# Patient Record
Sex: FEMALE | Race: ASIAN | NOT HISPANIC OR LATINO | URBAN - METROPOLITAN AREA
[De-identification: names, ages, dates, MRNs, and addresses within clinical notes are randomized per-mention and may not be internally consistent; named-entity substitution may affect disease eponyms.]

---

## 2017-01-09 ENCOUNTER — OUTPATIENT (OUTPATIENT)
Dept: OUTPATIENT SERVICES | Facility: HOSPITAL | Age: 47
LOS: 1 days | End: 2017-01-09

## 2017-01-09 VITALS
HEART RATE: 64 BPM | TEMPERATURE: 99 F | WEIGHT: 132.06 LBS | SYSTOLIC BLOOD PRESSURE: 108 MMHG | DIASTOLIC BLOOD PRESSURE: 64 MMHG | RESPIRATION RATE: 16 BRPM | HEIGHT: 66.5 IN

## 2017-01-09 DIAGNOSIS — Z98.890 OTHER SPECIFIED POSTPROCEDURAL STATES: Chronic | ICD-10-CM

## 2017-01-09 DIAGNOSIS — N85.8 OTHER SPECIFIED NONINFLAMMATORY DISORDERS OF UTERUS: ICD-10-CM

## 2017-01-09 DIAGNOSIS — N85.9 NONINFLAMMATORY DISORDER OF UTERUS, UNSPECIFIED: ICD-10-CM

## 2017-01-09 LAB
ALBUMIN SERPL ELPH-MCNC: 3.8 G/DL — SIGNIFICANT CHANGE UP (ref 3.3–5)
ALP SERPL-CCNC: 31 U/L — LOW (ref 40–120)
ALT FLD-CCNC: 11 U/L — SIGNIFICANT CHANGE UP (ref 4–33)
AST SERPL-CCNC: 14 U/L — SIGNIFICANT CHANGE UP (ref 4–32)
BILIRUB SERPL-MCNC: 0.2 MG/DL — SIGNIFICANT CHANGE UP (ref 0.2–1.2)
BLD GP AB SCN SERPL QL: NEGATIVE — SIGNIFICANT CHANGE UP
BUN SERPL-MCNC: 6 MG/DL — LOW (ref 7–23)
CALCIUM SERPL-MCNC: 8.6 MG/DL — SIGNIFICANT CHANGE UP (ref 8.4–10.5)
CHLORIDE SERPL-SCNC: 103 MMOL/L — SIGNIFICANT CHANGE UP (ref 98–107)
CO2 SERPL-SCNC: 26 MMOL/L — SIGNIFICANT CHANGE UP (ref 22–31)
CREAT SERPL-MCNC: 0.62 MG/DL — SIGNIFICANT CHANGE UP (ref 0.5–1.3)
GLUCOSE SERPL-MCNC: 66 MG/DL — LOW (ref 70–99)
HCT VFR BLD CALC: 34.9 % — SIGNIFICANT CHANGE UP (ref 34.5–45)
HGB BLD-MCNC: 11.1 G/DL — LOW (ref 11.5–15.5)
MCHC RBC-ENTMCNC: 26 PG — LOW (ref 27–34)
MCHC RBC-ENTMCNC: 31.8 % — LOW (ref 32–36)
MCV RBC AUTO: 81.7 FL — SIGNIFICANT CHANGE UP (ref 80–100)
PLATELET # BLD AUTO: 410 K/UL — HIGH (ref 150–400)
PMV BLD: 9.8 FL — SIGNIFICANT CHANGE UP (ref 7–13)
POTASSIUM SERPL-MCNC: 4.1 MMOL/L — SIGNIFICANT CHANGE UP (ref 3.5–5.3)
POTASSIUM SERPL-SCNC: 4.1 MMOL/L — SIGNIFICANT CHANGE UP (ref 3.5–5.3)
PROT SERPL-MCNC: 6.4 G/DL — SIGNIFICANT CHANGE UP (ref 6–8.3)
RBC # BLD: 4.27 M/UL — SIGNIFICANT CHANGE UP (ref 3.8–5.2)
RBC # FLD: SIGNIFICANT CHANGE UP % (ref 10.3–14.5)
RH IG SCN BLD-IMP: POSITIVE — SIGNIFICANT CHANGE UP
SODIUM SERPL-SCNC: 141 MMOL/L — SIGNIFICANT CHANGE UP (ref 135–145)
WBC # BLD: 5.72 K/UL — SIGNIFICANT CHANGE UP (ref 3.8–10.5)
WBC # FLD AUTO: 5.72 K/UL — SIGNIFICANT CHANGE UP (ref 3.8–10.5)

## 2017-01-09 RX ORDER — SODIUM CHLORIDE 9 MG/ML
1000 INJECTION, SOLUTION INTRAVENOUS
Qty: 0 | Refills: 0 | Status: DISCONTINUED | OUTPATIENT
Start: 2017-01-20 | End: 2017-01-21

## 2017-01-09 NOTE — H&P PST ADULT - GENITOURINARY COMMENTS
hx of fibroids with heavy menstruation x 5 years. Pt reports fibroids recently enlarged, and she has had vaginal bleeding between periods x 6 months.

## 2017-01-09 NOTE — H&P PST ADULT - NSANTHOSAYNRD_GEN_A_CORE
No. RADHA screening performed.  STOP BANG Legend: 0-2 = LOW Risk; 3-4 = INTERMEDIATE Risk; 5-8 = HIGH Risk

## 2017-01-09 NOTE — H&P PST ADULT - HISTORY OF PRESENT ILLNESS
45 y/o female with hx 47 y/o female with hx of fibroids with heavy menstruation x 5 years. Pt reports fibroids recently enlarged, and she has had vaginal bleeding between periods x 6 months. Now scheduled for Exploratory Laparotomy, NORMA, BSO, possible staging 1/220/17. 47 y/o female with hx of fibroids with heavy menstruation x 5 years. Pt reports fibroids recently enlarged, and she has had vaginal bleeding between periods x 6 months. Now scheduled for Exploratory Laparotomy, NORMA, BSO, possible staging 1/20/17.

## 2017-01-09 NOTE — H&P PST ADULT - PROBLEM SELECTOR PLAN 1
Exploratory Laparotomy, NORMA, BSO, possible staging 1/220/17.     CBC CMP T&S, UCG on admission Exploratory Laparotomy, NORMA, BSO, possible staging 1/20/17.     CBC CMP T&S, UCG on admission

## 2017-01-09 NOTE — H&P PST ADULT - NEGATIVE GENERAL GENITOURINARY SYMPTOMS
no hematuria/no flank pain L/no bladder infections no bladder infections/normal urinary frequency/no hematuria/no flank pain L

## 2017-01-09 NOTE — H&P PST ADULT - LANGUAGE ASSISTANCE NEEDED
No-Patient/Caregiver offered and refused free interpretation services./Pt would like  service day of admission

## 2017-01-09 NOTE — H&P PST ADULT - ASSESSMENT
47 y/o female with hx of fibroids with heavy menstruation x 5 years. Pt reports fibroids recently enlarged, and she has had vaginal bleeding between periods x 6 months. Now scheduled for Exploratory Laparotomy, NORMA, BSO, possible staging 1/220/17. 45 y/o female with hx of fibroids with heavy menstruation x 5 years. Pt reports fibroids recently enlarged, and she has had vaginal bleeding between periods x 6 months. Now scheduled for Exploratory Laparotomy, NORMA, BSO, possible staging 1/20/17.

## 2017-01-19 ENCOUNTER — RESULT REVIEW (OUTPATIENT)
Age: 47
End: 2017-01-19

## 2017-01-20 ENCOUNTER — INPATIENT (INPATIENT)
Facility: HOSPITAL | Age: 47
LOS: 1 days | Discharge: HOME | End: 2017-01-22
Attending: OBSTETRICS & GYNECOLOGY | Admitting: OBSTETRICS & GYNECOLOGY
Payer: MEDICAID

## 2017-01-20 ENCOUNTER — APPOINTMENT (OUTPATIENT)
Dept: GYNECOLOGIC ONCOLOGY | Facility: HOSPITAL | Age: 47
End: 2017-01-20

## 2017-01-20 VITALS
RESPIRATION RATE: 16 BRPM | TEMPERATURE: 99 F | SYSTOLIC BLOOD PRESSURE: 115 MMHG | WEIGHT: 132.06 LBS | DIASTOLIC BLOOD PRESSURE: 69 MMHG | HEIGHT: 66.5 IN | HEART RATE: 68 BPM | OXYGEN SATURATION: 99 %

## 2017-01-20 DIAGNOSIS — N85.8 OTHER SPECIFIED NONINFLAMMATORY DISORDERS OF UTERUS: ICD-10-CM

## 2017-01-20 DIAGNOSIS — Z98.890 OTHER SPECIFIED POSTPROCEDURAL STATES: Chronic | ICD-10-CM

## 2017-01-20 LAB
HCG UR QL: NEGATIVE — SIGNIFICANT CHANGE UP
RH IG SCN BLD-IMP: POSITIVE — SIGNIFICANT CHANGE UP

## 2017-01-20 PROCEDURE — 88307 TISSUE EXAM BY PATHOLOGIST: CPT | Mod: 26

## 2017-01-20 PROCEDURE — 58150 TOTAL HYSTERECTOMY: CPT | Mod: GC

## 2017-01-20 PROCEDURE — 88331 PATH CONSLTJ SURG 1 BLK 1SPC: CPT | Mod: 26

## 2017-01-20 RX ORDER — OXYCODONE HYDROCHLORIDE 5 MG/1
1 TABLET ORAL
Qty: 20 | Refills: 0 | OUTPATIENT
Start: 2017-01-20 | End: 2017-01-27

## 2017-01-20 RX ORDER — ONDANSETRON 8 MG/1
4 TABLET, FILM COATED ORAL ONCE
Qty: 0 | Refills: 0 | Status: DISCONTINUED | OUTPATIENT
Start: 2017-01-20 | End: 2017-01-22

## 2017-01-20 RX ORDER — HEPARIN SODIUM 5000 [USP'U]/ML
5000 INJECTION INTRAVENOUS; SUBCUTANEOUS EVERY 12 HOURS
Qty: 0 | Refills: 0 | Status: DISCONTINUED | OUTPATIENT
Start: 2017-01-20 | End: 2017-01-22

## 2017-01-20 RX ORDER — OXYCODONE HYDROCHLORIDE 5 MG/1
1 TABLET ORAL
Qty: 20 | Refills: 0 | OUTPATIENT
Start: 2017-01-20 | End: 2017-01-25

## 2017-01-20 RX ORDER — SODIUM CHLORIDE 9 MG/ML
1000 INJECTION, SOLUTION INTRAVENOUS
Qty: 0 | Refills: 0 | Status: DISCONTINUED | OUTPATIENT
Start: 2017-01-20 | End: 2017-01-21

## 2017-01-20 RX ORDER — DOCUSATE SODIUM 100 MG
100 CAPSULE ORAL
Qty: 0 | Refills: 0 | Status: DISCONTINUED | OUTPATIENT
Start: 2017-01-20 | End: 2017-01-22

## 2017-01-20 RX ORDER — HYDROMORPHONE HYDROCHLORIDE 2 MG/ML
0.5 INJECTION INTRAMUSCULAR; INTRAVENOUS; SUBCUTANEOUS
Qty: 0 | Refills: 0 | Status: DISCONTINUED | OUTPATIENT
Start: 2017-01-20 | End: 2017-01-21

## 2017-01-20 RX ORDER — HEPARIN SODIUM 5000 [USP'U]/ML
5000 INJECTION INTRAVENOUS; SUBCUTANEOUS EVERY 8 HOURS
Qty: 0 | Refills: 0 | Status: DISCONTINUED | OUTPATIENT
Start: 2017-01-20 | End: 2017-01-20

## 2017-01-20 RX ORDER — ONDANSETRON 8 MG/1
4 TABLET, FILM COATED ORAL EVERY 8 HOURS
Qty: 0 | Refills: 0 | Status: DISCONTINUED | OUTPATIENT
Start: 2017-01-20 | End: 2017-01-22

## 2017-01-20 RX ORDER — HEPARIN SODIUM 5000 [USP'U]/ML
5000 INJECTION INTRAVENOUS; SUBCUTANEOUS ONCE
Qty: 0 | Refills: 0 | Status: COMPLETED | OUTPATIENT
Start: 2017-01-20 | End: 2017-01-20

## 2017-01-20 RX ADMIN — SODIUM CHLORIDE 100 MILLILITER(S): 9 INJECTION, SOLUTION INTRAVENOUS at 20:30

## 2017-01-20 RX ADMIN — SODIUM CHLORIDE 30 MILLILITER(S): 9 INJECTION, SOLUTION INTRAVENOUS at 16:43

## 2017-01-20 RX ADMIN — HEPARIN SODIUM 5000 UNIT(S): 5000 INJECTION INTRAVENOUS; SUBCUTANEOUS at 16:39

## 2017-01-20 RX ADMIN — HYDROMORPHONE HYDROCHLORIDE 0.5 MILLIGRAM(S): 2 INJECTION INTRAMUSCULAR; INTRAVENOUS; SUBCUTANEOUS at 21:03

## 2017-01-20 RX ADMIN — HYDROMORPHONE HYDROCHLORIDE 0.5 MILLIGRAM(S): 2 INJECTION INTRAMUSCULAR; INTRAVENOUS; SUBCUTANEOUS at 21:20

## 2017-01-20 NOTE — DISCHARGE NOTE ADULT - CARE PROVIDER_API CALL
Vandana Weathers (MD), OBSGYN  Oncology  [address] [place] [address]  Phone: [phone]  Fax: [phone] Vandana Weathers (MD), OBSGYN  Oncology  [address] [place] [address]  Phone: [phone]  Fax: [phone]

## 2017-01-20 NOTE — BRIEF OPERATIVE NOTE - PROCEDURE
Hysterectomy, total, abdominal, with bilateral salpingectomy  01/20/2017    Active  [facility]

## 2017-01-20 NOTE — DISCHARGE NOTE ADULT - PATIENT PORTAL LINK FT
“You can access the [facility] Patient Portal, offered by [facility], by registering with the following website: [email or web address]”

## 2017-01-20 NOTE — DISCHARGE NOTE ADULT - MEDICATION SUMMARY - MEDICATIONS TO TAKE
I will START or STAY ON the medications listed below when I get home from the hospital:    no home medications  --     -- Indication: For n/a    Naprosyn 500 mg oral tablet  -- 1 tab(s) by mouth 2 times a day MDD:2  -- Check with your doctor before becoming pregnant.  May cause drowsiness or dizziness.  Obtain medical advice before taking any non-prescription drugs as some may affect the action of this medication.  Take with food or milk.    -- Indication: For pain    acetaminophen-oxyCODONE 325 mg-5 mg oral tablet  -- 1 tab(s) by mouth every 6 hours, As Needed MDD:4  -- Caution federal law prohibits the transfer of this drug to any person other  than the person for whom it was prescribed.  May cause drowsiness.  Alcohol may intensify this effect.  Use care when operating dangerous machinery.  This prescription cannot be refilled.  This product contains acetaminophen.  Do not use  with any other product containing acetaminophen to prevent possible liver damage.  Using more of this medication than prescribed may cause serious breathing problems.    -- Indication: For pain

## 2017-01-20 NOTE — DISCHARGE NOTE ADULT - CARE PLAN
Principal Discharge DX:	Uterine fibroid  Goal:	Return to normal function  Instructions for follow-up, activity and diet:	1) Make a follow up appointment with Dr. Weathers in 1-2 weeks  2) Regular diet, Percocet and naprosyn for pain  3) No Heavy lifting, straining or sex for 4-6 weeks

## 2017-01-20 NOTE — BRIEF OPERATIVE NOTE - OPERATION/FINDINGS
22 week size globular uterus, left ovary with corpus luteal cyst, right ovary and fallopian tube grossly normal.  Grossly unremarkable appendix and upper abdomen

## 2017-01-20 NOTE — DISCHARGE NOTE ADULT - PLAN OF CARE
Return to normal function 1) Make a follow up appointment with Dr. Weathers in 1-2 weeks  2) Regular diet, Percocet and naprosyn for pain  3) No Heavy lifting, straining or sex for 4-6 weeks

## 2017-01-20 NOTE — DISCHARGE NOTE ADULT - HOSPITAL COURSE
45 yo s/p ex-lap, NORMA, BSO. Please see operative report for more details.     POD#1 pt was advanced to a regular diet. Rodriguez catheter was removed. Pain was well controlled with PCA.    On the day of discharge pt was ambulating, voiding and tolerating PO.    Pt will follow up in the office with Dr. Weathers in 1-2 weeks.    Postoperative Hct: 34.9-> 45 yo s/p ex-lap, NORMA, BSO. Please see operative report for more details.     POD#1 pt was advanced to a regular diet. Rodriguez catheter was removed. Pain was well controlled with PCA.    POD#2 PCA discontinued, pain well controlled with PO meds. Tolerating regular diet, voiding adequately.    On the day of discharge pt was ambulating, voiding and tolerating PO.    Pt will follow up in the office with Dr. Weathers in 1-2 weeks.    Postoperative Hct: 34.9->38.7->36.8 45 yo s/p ex-lap, NORMA, BSO. Please see operative report for more details.     POD#1 pt was advanced to a regular diet. Rodriguez catheter was removed. Pain was well controlled with oxycodone.    POD#2 pain well controlled with PO meds. Tolerating regular diet, voiding adequately.    On the day of discharge pt was ambulating, voiding and tolerating PO.    Pt will follow up in the office with Dr. Weathers in 1-2 weeks.    Postoperative Hct: 34.9->38.7->36.8

## 2017-01-20 NOTE — DISCHARGE NOTE ADULT - INSTRUCTIONS
Eat well balanced diet ,drink 8-10 glasses of water each day .IF temp above 100.4,bleeding from the surgical site notify MD

## 2017-01-21 LAB
BLD GP AB SCN SERPL QL: NEGATIVE — SIGNIFICANT CHANGE UP
BUN SERPL-MCNC: 11 MG/DL — SIGNIFICANT CHANGE UP (ref 7–23)
BUN SERPL-MCNC: 9 MG/DL — SIGNIFICANT CHANGE UP (ref 7–23)
CALCIUM SERPL-MCNC: 7.6 MG/DL — LOW (ref 8.4–10.5)
CALCIUM SERPL-MCNC: 7.9 MG/DL — LOW (ref 8.4–10.5)
CHLORIDE SERPL-SCNC: 101 MMOL/L — SIGNIFICANT CHANGE UP (ref 98–107)
CHLORIDE SERPL-SCNC: 101 MMOL/L — SIGNIFICANT CHANGE UP (ref 98–107)
CO2 SERPL-SCNC: 17 MMOL/L — LOW (ref 22–31)
CO2 SERPL-SCNC: 22 MMOL/L — SIGNIFICANT CHANGE UP (ref 22–31)
CREAT SERPL-MCNC: 0.6 MG/DL — SIGNIFICANT CHANGE UP (ref 0.5–1.3)
CREAT SERPL-MCNC: 0.63 MG/DL — SIGNIFICANT CHANGE UP (ref 0.5–1.3)
GLUCOSE SERPL-MCNC: 109 MG/DL — HIGH (ref 70–99)
GLUCOSE SERPL-MCNC: 126 MG/DL — HIGH (ref 70–99)
HCT VFR BLD CALC: 36.8 % — SIGNIFICANT CHANGE UP (ref 34.5–45)
HCT VFR BLD CALC: 38.7 % — SIGNIFICANT CHANGE UP (ref 34.5–45)
HGB BLD-MCNC: 12 G/DL — SIGNIFICANT CHANGE UP (ref 11.5–15.5)
HGB BLD-MCNC: 12.4 G/DL — SIGNIFICANT CHANGE UP (ref 11.5–15.5)
MCHC RBC-ENTMCNC: 27.5 PG — SIGNIFICANT CHANGE UP (ref 27–34)
MCHC RBC-ENTMCNC: 27.6 PG — SIGNIFICANT CHANGE UP (ref 27–34)
MCHC RBC-ENTMCNC: 32 % — SIGNIFICANT CHANGE UP (ref 32–36)
MCHC RBC-ENTMCNC: 32.6 % — SIGNIFICANT CHANGE UP (ref 32–36)
MCV RBC AUTO: 84.2 FL — SIGNIFICANT CHANGE UP (ref 80–100)
MCV RBC AUTO: 86 FL — SIGNIFICANT CHANGE UP (ref 80–100)
PLATELET # BLD AUTO: 152 K/UL — SIGNIFICANT CHANGE UP (ref 150–400)
PLATELET # BLD AUTO: 282 K/UL — SIGNIFICANT CHANGE UP (ref 150–400)
PMV BLD: 10.8 FL — SIGNIFICANT CHANGE UP (ref 7–13)
PMV BLD: SIGNIFICANT CHANGE UP FL (ref 7–13)
POTASSIUM SERPL-MCNC: 4 MMOL/L — SIGNIFICANT CHANGE UP (ref 3.5–5.3)
POTASSIUM SERPL-MCNC: 4.6 MMOL/L — SIGNIFICANT CHANGE UP (ref 3.5–5.3)
POTASSIUM SERPL-SCNC: 4 MMOL/L — SIGNIFICANT CHANGE UP (ref 3.5–5.3)
POTASSIUM SERPL-SCNC: 4.6 MMOL/L — SIGNIFICANT CHANGE UP (ref 3.5–5.3)
RBC # BLD: 4.37 M/UL — SIGNIFICANT CHANGE UP (ref 3.8–5.2)
RBC # BLD: 4.5 M/UL — SIGNIFICANT CHANGE UP (ref 3.8–5.2)
RBC # FLD: 23.7 % — HIGH (ref 10.3–14.5)
RBC # FLD: 24.1 % — HIGH (ref 10.3–14.5)
RH IG SCN BLD-IMP: POSITIVE — SIGNIFICANT CHANGE UP
SODIUM SERPL-SCNC: 135 MMOL/L — SIGNIFICANT CHANGE UP (ref 135–145)
SODIUM SERPL-SCNC: 136 MMOL/L — SIGNIFICANT CHANGE UP (ref 135–145)
WBC # BLD: 18.84 K/UL — HIGH (ref 3.8–10.5)
WBC # BLD: 19.26 K/UL — HIGH (ref 3.8–10.5)
WBC # FLD AUTO: 18.84 K/UL — HIGH (ref 3.8–10.5)
WBC # FLD AUTO: 19.26 K/UL — HIGH (ref 3.8–10.5)

## 2017-01-21 RX ORDER — HYDROMORPHONE HYDROCHLORIDE 2 MG/ML
0.5 INJECTION INTRAMUSCULAR; INTRAVENOUS; SUBCUTANEOUS EVERY 6 HOURS
Qty: 0 | Refills: 0 | Status: DISCONTINUED | OUTPATIENT
Start: 2017-01-21 | End: 2017-01-22

## 2017-01-21 RX ORDER — OXYCODONE HYDROCHLORIDE 5 MG/1
5 TABLET ORAL EVERY 4 HOURS
Qty: 0 | Refills: 0 | Status: DISCONTINUED | OUTPATIENT
Start: 2017-01-21 | End: 2017-01-22

## 2017-01-21 RX ORDER — ACETAMINOPHEN 500 MG
650 TABLET ORAL EVERY 6 HOURS
Qty: 0 | Refills: 0 | Status: DISCONTINUED | OUTPATIENT
Start: 2017-01-21 | End: 2017-01-22

## 2017-01-21 RX ORDER — OXYCODONE HYDROCHLORIDE 5 MG/1
10 TABLET ORAL EVERY 4 HOURS
Qty: 0 | Refills: 0 | Status: DISCONTINUED | OUTPATIENT
Start: 2017-01-21 | End: 2017-01-22

## 2017-01-21 RX ORDER — HYDROMORPHONE HYDROCHLORIDE 2 MG/ML
0.5 INJECTION INTRAMUSCULAR; INTRAVENOUS; SUBCUTANEOUS EVERY 4 HOURS
Qty: 0 | Refills: 0 | Status: DISCONTINUED | OUTPATIENT
Start: 2017-01-21 | End: 2017-01-21

## 2017-01-21 RX ORDER — SIMETHICONE 80 MG/1
80 TABLET, CHEWABLE ORAL
Qty: 0 | Refills: 0 | Status: DISCONTINUED | OUTPATIENT
Start: 2017-01-21 | End: 2017-01-22

## 2017-01-21 RX ADMIN — Medication 100 MILLIGRAM(S): at 17:19

## 2017-01-21 RX ADMIN — OXYCODONE HYDROCHLORIDE 10 MILLIGRAM(S): 5 TABLET ORAL at 04:42

## 2017-01-21 RX ADMIN — Medication 650 MILLIGRAM(S): at 09:58

## 2017-01-21 RX ADMIN — OXYCODONE HYDROCHLORIDE 10 MILLIGRAM(S): 5 TABLET ORAL at 21:02

## 2017-01-21 RX ADMIN — Medication 650 MILLIGRAM(S): at 01:37

## 2017-01-21 RX ADMIN — OXYCODONE HYDROCHLORIDE 10 MILLIGRAM(S): 5 TABLET ORAL at 04:12

## 2017-01-21 RX ADMIN — SIMETHICONE 80 MILLIGRAM(S): 80 TABLET, CHEWABLE ORAL at 20:13

## 2017-01-21 RX ADMIN — OXYCODONE HYDROCHLORIDE 10 MILLIGRAM(S): 5 TABLET ORAL at 21:32

## 2017-01-21 RX ADMIN — HEPARIN SODIUM 5000 UNIT(S): 5000 INJECTION INTRAVENOUS; SUBCUTANEOUS at 05:36

## 2017-01-21 RX ADMIN — Medication 650 MILLIGRAM(S): at 09:28

## 2017-01-21 RX ADMIN — OXYCODONE HYDROCHLORIDE 10 MILLIGRAM(S): 5 TABLET ORAL at 14:45

## 2017-01-21 RX ADMIN — HEPARIN SODIUM 5000 UNIT(S): 5000 INJECTION INTRAVENOUS; SUBCUTANEOUS at 17:19

## 2017-01-21 RX ADMIN — Medication 100 MILLIGRAM(S): at 05:36

## 2017-01-21 RX ADMIN — SIMETHICONE 80 MILLIGRAM(S): 80 TABLET, CHEWABLE ORAL at 12:55

## 2017-01-21 RX ADMIN — OXYCODONE HYDROCHLORIDE 10 MILLIGRAM(S): 5 TABLET ORAL at 14:15

## 2017-01-21 RX ADMIN — Medication 650 MILLIGRAM(S): at 01:07

## 2017-01-21 NOTE — PROVIDER CONTACT NOTE (OTHER) - BACKGROUND
S/p JUSTIN, malignant neoplasm, history of prediabetes, vitamin D deficiency, breast cancer, chemotherapy, mastectomy, hammer toe correction, myomectomy.

## 2017-01-22 VITALS
RESPIRATION RATE: 18 BRPM | HEART RATE: 88 BPM | TEMPERATURE: 98 F | DIASTOLIC BLOOD PRESSURE: 71 MMHG | OXYGEN SATURATION: 100 % | SYSTOLIC BLOOD PRESSURE: 135 MMHG

## 2017-01-22 RX ORDER — OXYCODONE HYDROCHLORIDE 5 MG/1
1 TABLET ORAL
Qty: 30 | Refills: 0 | OUTPATIENT
Start: 2017-01-22

## 2017-01-22 RX ADMIN — HEPARIN SODIUM 5000 UNIT(S): 5000 INJECTION INTRAVENOUS; SUBCUTANEOUS at 05:18

## 2017-01-22 RX ADMIN — Medication 100 MILLIGRAM(S): at 05:18

## 2017-01-22 RX ADMIN — OXYCODONE HYDROCHLORIDE 10 MILLIGRAM(S): 5 TABLET ORAL at 11:32

## 2017-01-22 RX ADMIN — OXYCODONE HYDROCHLORIDE 10 MILLIGRAM(S): 5 TABLET ORAL at 11:02

## 2017-01-22 RX ADMIN — SIMETHICONE 80 MILLIGRAM(S): 80 TABLET, CHEWABLE ORAL at 05:19

## 2017-01-28 LAB — SURGICAL PATHOLOGY STUDY: SIGNIFICANT CHANGE UP

## 2017-02-02 ENCOUNTER — APPOINTMENT (OUTPATIENT)
Dept: GYNECOLOGIC ONCOLOGY | Facility: CLINIC | Age: 47
End: 2017-02-02

## 2017-02-02 VITALS
HEIGHT: 67 IN | BODY MASS INDEX: 19.78 KG/M2 | DIASTOLIC BLOOD PRESSURE: 72 MMHG | HEART RATE: 83 BPM | WEIGHT: 126 LBS | SYSTOLIC BLOOD PRESSURE: 117 MMHG | TEMPERATURE: 98.5 F

## 2017-02-02 PROBLEM — D25.9 LEIOMYOMA OF UTERUS, UNSPECIFIED: Chronic | Status: ACTIVE | Noted: 2017-01-09

## 2017-03-13 ENCOUNTER — APPOINTMENT (OUTPATIENT)
Dept: GYNECOLOGIC ONCOLOGY | Facility: CLINIC | Age: 47
End: 2017-03-13

## 2017-03-13 VITALS
HEART RATE: 80 BPM | TEMPERATURE: 97.7 F | WEIGHT: 130 LBS | SYSTOLIC BLOOD PRESSURE: 128 MMHG | DIASTOLIC BLOOD PRESSURE: 73 MMHG | HEIGHT: 67 IN | BODY MASS INDEX: 20.4 KG/M2

## 2017-03-13 DIAGNOSIS — D25.9 LEIOMYOMA OF UTERUS, UNSPECIFIED: ICD-10-CM

## 2017-09-27 ENCOUNTER — RESULT REVIEW (OUTPATIENT)
Age: 47
End: 2017-09-27

## 2018-10-29 ENCOUNTER — RESULT REVIEW (OUTPATIENT)
Age: 48
End: 2018-10-29

## 2019-01-18 ENCOUNTER — RESULT REVIEW (OUTPATIENT)
Age: 49
End: 2019-01-18

## 2019-03-12 ENCOUNTER — TRANSCRIPTION ENCOUNTER (OUTPATIENT)
Age: 49
End: 2019-03-12

## 2020-09-09 ENCOUNTER — RESULT REVIEW (OUTPATIENT)
Age: 50
End: 2020-09-09

## 2021-10-20 ENCOUNTER — TRANSCRIPTION ENCOUNTER (OUTPATIENT)
Age: 51
End: 2021-10-20

## 2021-12-01 NOTE — ASU PATIENT PROFILE, ADULT - FALLEN IN THE PAST
VTE prop: heparin sc q12 hrs  - Dispo- Pending clinical improvement. Home hospice referral requested. Patient deemed not a hospice candidate. Hospice team to reach out to patient's wife today (12/1).    DNR/DNI    Updated patient's wife, Natasha at bedside on 12/1. no

## 2021-12-30 ENCOUNTER — TRANSCRIPTION ENCOUNTER (OUTPATIENT)
Age: 51
End: 2021-12-30

## 2022-01-13 NOTE — H&P PST ADULT - AS BP NONINV METHOD
LOV  6/8/21  RTC  12/8/21)  F/U No schedule    Please verify if you keeping patient. auscultated w/ stethoscope

## 2024-06-22 NOTE — PATIENT PROFILE ADULT. - URINARY CATHETER
Occupational Therapy    Visit Type: treatment  SUBJECTIVE  Patient agreed to participate in therapy this date.  Patient verbally agrees to allow the following to be present during session: spouse      Patient participated in all scheduled therapy time this session.    Patient / Family Goal: return to previous functional status    Pain   Patient does not demonstrate pain behaviors.    OBJECTIVE     Cognitive Status   Affect/Behavior    - cooperative and pleasant  Orientation    - Oriented to: person  Functional Communication   - Overall Communication Status: impaired (baseline dementia)  Attention Span    - Attention: impaired - attends with cues to redirect   - Attention impairment: reduced memory and impulsivity  Following Direction   - follows one step commands with increased time and follows one step commands with repetition  Transition Between Tasks   - transitions with cues  Executive Function    - Organization: impaired   - Sequencing: impaired   - Problem Solving: impaired   - Termination: intact   - Initiation: impaired   - Time Management: impaired   - Planning: impaired   - Execution: impaired  Memory   - - decreased recall of recent events - decreased short term memory impaired  Safety Awareness/Insight   - impulsive, decreased awareness of need for assistance and decreased awareness of need for safety (has VPA)  Awareness of Deficits   - decreased awareness of deficits        Transfers  Assistive devices: gait belt  - Sit to stand: supervision  - Stand to sit: supervision, with verbal cues (cues for slower sitting as he plops in chair especially when fatigued)  - Stand pivot: supervision, with verbal cues, with tactile cues (cues to slow down)  Patient ambulates without AD, improved longer community distances. Supervision to monitor safety and balance, navigate      Activities of Daily Living (ADLs)  Dry run shower transfer using walk in with simulated ledge without grab bars. He does not have grab bars,  however wife may consider obtaining.  Overall light touch to ensure safety with balance.   Per wife, patient showers every day. She lays out his clothing and patient is able to complete with supervision.        Interventions  Therapeutic Exercise  Use of PVC pipe patterns to address problem solving, bilateral hand coordination. He is able to complete the first two patterns with overall moderate cues for problem solving.   Skilled input: verbal instruction/cues, tactile instruction/cues and as detailed above  Verbal Consent: Writer verbally educated and received verbal consent for hand placement, positioning of patient, and techniques to be performed today from patient and patient's significant other          ASSESSMENT  Impairments: balance, cardiovascular endurance, range of motion, skin integrity, activity tolerance, bed mobility, cognitive, decreased insight into deficits, emotional/psychological, safety awareness, communication, desire/motivation and sensation  Functional Limitations: bed mobility, functional mobility, grooming, toileting, IADLs, dressing, bathing, functional transfers, participating in meaningful/purposeful activities, job performance and showering  Per patient's wife, patient is better than his baseline.  See note for details.  Patient's wife reinforces that she does all the home tasks and patient will not be driving.  She is open to patient having home therapy if needed.     Discharge Recommendations:  PT/OT Mobility Equipment for Discharge: will continue to assess  PT/OT ADL Equipment for Discharge: continue to assess  OT Identified Barriers to Discharge: weakness      Progress: progressing toward goals    Education:   - Present and ready to learn: patient  Education provided during session:  - See body of note.  - Results of above outlined education: Needs reinforcement    Patient at End of Session:   Location: in chair  Safety measures: alarm system in place/re-engaged and call light  within reach  Handoff to: nurse assistant and family/caregiver      Assistant to continue with current plan of care, current goals are appropriate, patient progressing towards set goals      PLAN  Suggestions for next session as indicated: Treatment plan for next session: SUN: if time allows. Per wife, patient needs prompts to shower, will often refuse, but showers daily.  Shower using walk in and simulated ledge. Must wear ear plug in left ear during shower due to high risk for left ear infection. Ear plugs in room on corner table. Command following, dynamic standing balance.  Has baseline dementia. Per wife, patient is at baseline and has shown significant improvements. Also left finger numbness has been there for many years and not related to Stroke.  Additional treatment options: Full ADL routine and shower  Plan considerations:  Outcome measures: BB, 9 Hole Peg (6/20)  Family/Care partner training details:  Hospital equipment in room:      Interventions: activity tolerance training, balance, caregiver training, community integration, continued evaluation, equipment eval/education, functional transfer training, HEP training, neuromuscular reeducation, patient education, positioning, safety training, stair training, therapeutic exercise, upper extremity strengthening/ROM, ADL retraining, bed mobility training, body mechanics, cognitive retraining, community reintegration, compensatory techniques, coordination, energy conservation, fine motor coordination activities, gait training, IADL, patient/family training, therapeutic activity, transfer training and use of adaptive equipment Frequency: 5-7 days per week  Frequency Comments: 90mins/day at least 5 days a week   Duration: 6/28/2024      Agreement to plan and goals: patient agrees with goals and treatment plan      GOALS  Review Date: 6/27/2024  Short Term Goals (STGs): to be met 7 days from date established, unless otherwise stated.  - Patient will complete  grooming at supervision level with adaptive equipment as deemed appropriate.  - Patient will complete bathing at supervision level with adaptive equipment as deemed appropriate.  - Patient will complete upper body dressing at independent level with adaptive equipment as deemed appropriate.  - Patient will complete lower body dressing at supervision level with adaptive equipment as deemed appropriate.  - Patient will complete toileting at supervision level with adaptive equipment as deemed appropriate.  - Patient will complete toilet transfer at supervision level with adaptive equipment as deemed appropriate.  - Patient will complete walk-in shower transfer at supervision level with adaptive equipment as deemed appropriate.  Long Term Goals (LTGs): to be met by discharge from rehab program.  - Patient will complete grooming at modified Independent level with adaptive equipment as deemed appropriate.  - Patient will complete bathing at modified Independent level with adaptive equipment as deemed appropriate.  - Patient will complete lower body dressing at modified Independent level with adaptive equipment as deemed appropriate.  - Patient will complete toileting at modified Independent level with adaptive equipment as deemed appropriate.  - Patient will complete toilet transfer at modified Independent level with adaptive equipment as deemed appropriate.          Therapy procedure time and total treatment time can be found documented on the Time Entry flowsheet   no